# Patient Record
Sex: FEMALE | URBAN - METROPOLITAN AREA
[De-identification: names, ages, dates, MRNs, and addresses within clinical notes are randomized per-mention and may not be internally consistent; named-entity substitution may affect disease eponyms.]

---

## 2020-07-25 ENCOUNTER — TELEPHONE ENCOUNTER (OUTPATIENT)
Dept: URBAN - METROPOLITAN AREA CLINIC 13 | Facility: CLINIC | Age: 25
End: 2020-07-25

## 2020-07-26 ENCOUNTER — TELEPHONE ENCOUNTER (OUTPATIENT)
Dept: URBAN - METROPOLITAN AREA CLINIC 13 | Facility: CLINIC | Age: 25
End: 2020-07-26

## 2025-05-15 ENCOUNTER — P2P PATIENT RECORD (OUTPATIENT)
Age: 30
End: 2025-05-15

## 2025-06-24 PROBLEM — 102614006: Status: ACTIVE | Noted: 2025-06-24

## 2025-06-25 ENCOUNTER — OFFICE VISIT (OUTPATIENT)
Dept: URBAN - METROPOLITAN AREA CLINIC 72 | Facility: CLINIC | Age: 30
End: 2025-06-25

## 2025-06-25 ENCOUNTER — DASHBOARD ENCOUNTERS (OUTPATIENT)
Age: 30
End: 2025-06-25

## 2025-06-25 DIAGNOSIS — K29.70 GASTRITIS WITHOUT BLEEDING, UNSPECIFIED CHRONICITY, UNSPECIFIED GASTRITIS TYPE: ICD-10-CM

## 2025-06-25 DIAGNOSIS — R10.13 EPIGASTRIC PAIN: ICD-10-CM

## 2025-06-25 PROBLEM — 79922009: Status: ACTIVE | Noted: 2025-06-25

## 2025-06-25 PROBLEM — 4556007: Status: ACTIVE | Noted: 2025-06-25

## 2025-06-25 PROCEDURE — 99204 OFFICE O/P NEW MOD 45 MIN: CPT

## 2025-06-25 RX ORDER — NORGESTIMATE AND ETHINYL ESTRADIOL 0.25-0.035
1 TABLET KIT ORAL ONCE A DAY
Qty: 28 | Status: ACTIVE | COMMUNITY

## 2025-06-25 RX ORDER — OMEPRAZOLE 40 MG/1
1 CAPSULE 1/2 TO 1 HOUR BEFORE MORNING MEAL CAPSULE, DELAYED RELEASE ORAL ONCE A DAY
Qty: 90 | Refills: 0 | OUTPATIENT
Start: 2025-06-25

## 2025-06-25 RX ORDER — OMEPRAZOLE 40 MG/1
1 CAPSULE 1/2 TO 1 HOUR BEFORE MORNING MEAL CAPSULE, DELAYED RELEASE ORAL ONCE A DAY
Qty: 30 | Status: ACTIVE | COMMUNITY

## 2025-06-25 NOTE — HPI-TODAY'S VISIT:
Patient is a 29-year-old female referred by Dr. Skylar Latif for abdominal pain. Patient is currently on omeprazole 40 mg daily. She noticed that when she ate certain foods and when she would eat late she would  have abd pain. Has nausea and vomiting. Trigger foods are greasy, spicy, or eating late.   She stool 2 times daily. Stools jose angel regular.   Patient takes in caffeine, alcohol, and vapes. She has noticed a difference since taking omeprazole but she has been on the medictaion for 3 years. Last EGD was about 2 years ago.